# Patient Record
Sex: FEMALE | Race: BLACK OR AFRICAN AMERICAN
[De-identification: names, ages, dates, MRNs, and addresses within clinical notes are randomized per-mention and may not be internally consistent; named-entity substitution may affect disease eponyms.]

---

## 2019-07-26 ENCOUNTER — HOSPITAL ENCOUNTER (EMERGENCY)
Dept: HOSPITAL 10 - FTE | Age: 19
Discharge: HOME | End: 2019-07-26
Payer: COMMERCIAL

## 2019-07-26 ENCOUNTER — HOSPITAL ENCOUNTER (EMERGENCY)
Dept: HOSPITAL 91 - FTE | Age: 19
Discharge: HOME | End: 2019-07-26
Payer: COMMERCIAL

## 2019-07-26 VITALS
WEIGHT: 160.28 LBS | HEIGHT: 64 IN | WEIGHT: 160.28 LBS | BODY MASS INDEX: 27.36 KG/M2 | BODY MASS INDEX: 27.36 KG/M2 | HEIGHT: 64 IN

## 2019-07-26 VITALS — RESPIRATION RATE: 18 BRPM | DIASTOLIC BLOOD PRESSURE: 81 MMHG | SYSTOLIC BLOOD PRESSURE: 117 MMHG | HEART RATE: 92 BPM

## 2019-07-26 DIAGNOSIS — R07.89: Primary | ICD-10-CM

## 2019-07-26 PROCEDURE — 93005 ELECTROCARDIOGRAM TRACING: CPT

## 2019-07-26 PROCEDURE — 99283 EMERGENCY DEPT VISIT LOW MDM: CPT

## 2019-07-26 NOTE — ERD
ER Documentation


Chief Complaint


Chief Complaint





c/o CWP x1 day, exacerbated by moveme, onset after "smoking weed" yesterday





HPI


Patient is a 19 years old female with no known past medical history presenting 


to the clinic for chest wall pain since yesterday at 5 PM.  Patient reports 


smoking marijuana which was followed by sharp left-sided chest pain that has not


resolved since then.  She denies taking any OTC medication.  Patient denies coug


h, sputum production, difficulty breathing, fever, chills, night sweats.





ROS


All systems reviewed and are negative except as per history of present illness.





Allergies


Allergies:  


Coded Allergies:  


     No Known Allergy (Unverified , 7/26/19)





PMhx/Soc


History of Surgery:  No


Anesthesia Reaction:  No


Hx Neurological Disorder:  No


Hx Respiratory Disorders:  No


Hx Cardiac Disorders:  No


Hx Psychiatric Problems:  No


Hx Miscellaneous Medical Probl:  No


Hx Alcohol Use:  No


Hx Substance Use:  Yes (marijuana)


Hx Tobacco Use:  No


Smoking Status:  Never smoker





FmHx


Family History:  No diabetes, No coronary disease, No other





Physical Exam


Vitals





Vital Signs


  Date      Temp  Pulse  Resp  B/P (MAP)   Pulse Ox  O2          O2 Flow    FiO2


Time                                                 Delivery    Rate


   7/26/19  98.2     92    18      117/81       100


     09:21                           (93)





Physical Exam


Const:   No acute distress


Head:   Atraumatic 


Eyes:    Normal Conjunctiva


Resp:   Clear to auscultation bilaterally


Cardio:   Regular rate and rhythm, no murmurs


Abd:    Soft, non tender, non distended. Normal bowel sounds


Skin:   No petechiae or rashes


Psych:    Normal Mood and Affect





Procedures/MDM


Patient was seen and evaluated for chest wall pain post marijuana inhalation.  


Low suspicion of pneumonia due to an unremarkable pulmonary exam.





EKG: 


Rate/Rhythm:       Normal Sinus Rhythm


QRS, ST, T-waves:    No changes consistent w/ acute ischemia


Impression:      No evidence of ischemia or arrhythmia





Patient stable ready for discharge.  Patient be discharged with ibuprofen 800 


mg.  Follow-up with PCP.





Departure


Diagnosis:  


   Primary Impression:  


   Chest wall pain


Condition:  Stable


Patient Instructions:  Chest Wall Pain, Costochondritis


Referrals:  


[facility]





Additional Instructions:  


Patient advised to return to the ED immediately for new or worsening symptoms. 


Patient advised to follow up with primary care provider in the next 24-48 hours.


Patient verbalized understanding and agrees with treatment plan and course of 


action.














If patient has no primary care they may follow up with














[facility]





[address] [place] [address]














or














[facility]





[address] [place] [address]














or














[facility]





[address] [place] [address]











FRANKIE YUN PA-C               Jul 26, 2019 10:39

## 2023-06-03 ENCOUNTER — HOSPITAL ENCOUNTER (EMERGENCY)
Dept: HOSPITAL 87 - ER | Age: 23
Discharge: LEFT BEFORE DISCHARGE | End: 2023-06-03
Payer: MEDICAID

## 2023-06-03 VITALS — SYSTOLIC BLOOD PRESSURE: 128 MMHG | DIASTOLIC BLOOD PRESSURE: 87 MMHG

## 2023-06-03 VITALS — BODY MASS INDEX: 24.45 KG/M2 | WEIGHT: 152.12 LBS | HEIGHT: 66 IN

## 2023-06-03 DIAGNOSIS — M79.645: Primary | ICD-10-CM

## 2023-06-03 PROCEDURE — 99283 EMERGENCY DEPT VISIT LOW MDM: CPT

## 2023-06-03 PROCEDURE — 81025 URINE PREGNANCY TEST: CPT

## 2024-06-28 ENCOUNTER — HOSPITAL ENCOUNTER (EMERGENCY)
Dept: HOSPITAL 87 - ER | Age: 24
LOS: 3 days | Discharge: HOME | End: 2024-07-01
Payer: MEDICAID

## 2024-06-28 VITALS — WEIGHT: 180.78 LBS | HEIGHT: 66 IN | BODY MASS INDEX: 29.05 KG/M2

## 2024-06-28 VITALS — OXYGEN SATURATION: 100 %

## 2024-06-28 DIAGNOSIS — Z88.6: ICD-10-CM

## 2024-06-28 DIAGNOSIS — R51.9: ICD-10-CM

## 2024-06-28 DIAGNOSIS — R46.2: Primary | ICD-10-CM

## 2024-06-28 LAB
APAP SERPL-MCNC: < 2 UG/ML (ref 10–30)
BASOPHILS NFR BLD AUTO: 1 % (ref 0–2)
BUN SERPL-MCNC: 5 MG/DL (ref 9–23)
CALCIUM SERPL-MCNC: 9.5 MG/DL (ref 8.7–10.4)
CHLORIDE SERPL-SCNC: 107 MEQ/L (ref 98–107)
CO2 SERPL-SCNC: 22 MEQ/L (ref 21–32)
CREAT SERPL-MCNC: 0.8 MG/DL (ref 0.6–1)
EOSINOPHIL NFR BLD AUTO: 0.6 % (ref 0–5)
ERYTHROCYTE [DISTWIDTH] IN BLOOD BY AUTOMATED COUNT: 18.5 % (ref 11.6–14.6)
ETHANOL SERPL-MCNC: < 10 MG/DL (ref ?–10)
GLUCOSE SERPL-MCNC: 75 MG/DL (ref 70–105)
HCG SERPL QL: NEGATIVE
HCT VFR BLD AUTO: 36.8 % (ref 36–48)
HGB BLD-MCNC: 11.5 G/DL (ref 12–16)
LYMPHOCYTES NFR BLD AUTO: 37.6 % (ref 20–50)
MANUAL DIF COMMENT BLD-IMP: 0
MCH RBC QN AUTO: 26.3 PG (ref 28–32)
MCHC RBC AUTO-ENTMCNC: 31.1 G/DL (ref 31–37)
MCV RBC AUTO: 84.3 FL (ref 81–99)
MONOCYTES NFR BLD AUTO: 6.7 % (ref 2–8)
NEUTROPHILS NFR BLD AUTO: 54.1 % (ref 40–76)
PLATELET # BLD AUTO: 411 X1000/UL (ref 130–400)
PMV BLD AUTO: 8 FL (ref 7.4–10.4)
POTASSIUM SERPL-SCNC: 3.4 MEQ/L (ref 3.5–5.1)
RBC # BLD AUTO: 4.37 MILL/UL (ref 4.2–5.4)
SODIUM SERPL-SCNC: 139 MEQ/L (ref 136–145)
TSH SERPL DL<=0.005 MIU/L-ACNC: 1.03 UIU/ML (ref 0.55–4.78)
WBC # BLD: 5.6 X1000/UL (ref 4.5–11)

## 2024-06-28 PROCEDURE — 84703 CHORIONIC GONADOTROPIN ASSAY: CPT

## 2024-06-28 PROCEDURE — 80307 DRUG TEST PRSMV CHEM ANLYZR: CPT

## 2024-06-28 PROCEDURE — 84443 ASSAY THYROID STIM HORMONE: CPT

## 2024-06-28 PROCEDURE — G0480 DRUG TEST DEF 1-7 CLASSES: HCPCS

## 2024-06-28 PROCEDURE — 72125 CT NECK SPINE W/O DYE: CPT

## 2024-06-28 PROCEDURE — 81003 URINALYSIS AUTO W/O SCOPE: CPT

## 2024-06-28 PROCEDURE — 85025 COMPLETE CBC W/AUTO DIFF WBC: CPT

## 2024-06-28 PROCEDURE — 99291 CRITICAL CARE FIRST HOUR: CPT

## 2024-06-28 PROCEDURE — 80048 BASIC METABOLIC PNL TOTAL CA: CPT

## 2024-06-28 PROCEDURE — 70450 CT HEAD/BRAIN W/O DYE: CPT

## 2024-06-28 PROCEDURE — 80329 ANALGESICS NON-OPIOID 1 OR 2: CPT

## 2024-06-28 PROCEDURE — 80305 DRUG TEST PRSMV DIR OPT OBS: CPT

## 2024-06-28 PROCEDURE — 96372 THER/PROPH/DIAG INJ SC/IM: CPT

## 2024-06-28 PROCEDURE — 80320 DRUG SCREEN QUANTALCOHOLS: CPT

## 2024-06-28 PROCEDURE — 36415 COLL VENOUS BLD VENIPUNCTURE: CPT

## 2024-06-28 RX ADMIN — MIDAZOLAM HYDROCHLORIDE ONE MG: 1 INJECTION, SOLUTION INTRAMUSCULAR; INTRAVENOUS at 12:02

## 2024-06-28 RX ADMIN — QUETIAPINE SCH MG: 50 TABLET, FILM COATED ORAL at 15:45

## 2024-06-28 RX ADMIN — HALOPERIDOL LACTATE ONE MG: 5 INJECTION, SOLUTION INTRAMUSCULAR at 12:02

## 2024-06-29 LAB
AMPHETAMINES UR QL SCN: (no result)
APPEARANCE UR: (no result)
BACTERIA #/AREA URNS AUTO: (no result) /[HPF]
BARBITURATES UR QL SCN: NEGATIVE
BENZODIAZ UR QL SCN: NEGATIVE
BZE UR QL SCN: NEGATIVE
CANNABINOIDS UR QL SCN: (no result)
COLOR UR: (no result)
GLUCOSE UR STRIP.AUTO-MCNC: NEGATIVE MG/DL
HGB UR QL STRIP: NEGATIVE
KETONES UR STRIP-MCNC: NEGATIVE MG/DL
LEUKOCYTE ESTERASE UR QL STRIP: (no result)
MDMA UR QL SCN: (no result)
METHADONE UR QL SCN: NEGATIVE
NITRITE UR QL STRIP: NEGATIVE
OPIATES UR QL SCN: NEGATIVE
PCP UR QL SCN: NEGATIVE
PH UR STRIP: 6 [PH] (ref 4.5–8)
PROT UR QL STRIP: (no result)
RBC #/AREA URNS AUTO: (no result) /HPF (ref 0–2)
SP GR UR STRIP: 1.03 (ref 1–1.03)
SQUAMOUS #/AREA URNS AUTO: (no result) /LPF
UROBILINOGEN UR STRIP-MCNC: 1 E.U./DL (ref 0.2–1)
WBC #/AREA URNS AUTO: (no result) /HPF (ref 0–2)

## 2024-06-29 RX ADMIN — HALOPERIDOL LACTATE STA MG: 5 INJECTION, SOLUTION INTRAMUSCULAR at 14:27

## 2024-06-29 RX ADMIN — DIPHENHYDRAMINE HYDROCHLORIDE STA MG: 50 INJECTION, SOLUTION INTRAMUSCULAR; INTRAVENOUS at 14:27

## 2024-06-30 RX ADMIN — HALOPERIDOL LACTATE ONE MG: 5 INJECTION, SOLUTION INTRAMUSCULAR at 12:30

## 2024-06-30 RX ADMIN — DIPHENHYDRAMINE HYDROCHLORIDE STA MG: 50 INJECTION, SOLUTION INTRAMUSCULAR; INTRAVENOUS at 12:21

## 2024-07-01 VITALS
SYSTOLIC BLOOD PRESSURE: 125 MMHG | RESPIRATION RATE: 16 BRPM | HEART RATE: 71 BPM | DIASTOLIC BLOOD PRESSURE: 74 MMHG | TEMPERATURE: 98.2 F